# Patient Record
Sex: MALE | Race: WHITE | NOT HISPANIC OR LATINO
[De-identification: names, ages, dates, MRNs, and addresses within clinical notes are randomized per-mention and may not be internally consistent; named-entity substitution may affect disease eponyms.]

---

## 2017-01-04 ENCOUNTER — APPOINTMENT (OUTPATIENT)
Dept: HEMATOLOGY ONCOLOGY | Facility: CLINIC | Age: 72
End: 2017-01-04

## 2017-01-04 PROBLEM — Z00.00 ENCOUNTER FOR PREVENTIVE HEALTH EXAMINATION: Status: ACTIVE | Noted: 2017-01-04

## 2020-03-07 ENCOUNTER — INPATIENT (INPATIENT)
Facility: HOSPITAL | Age: 75
LOS: 3 days | Discharge: HOME | End: 2020-03-11
Attending: INTERNAL MEDICINE | Admitting: INTERNAL MEDICINE
Payer: MEDICARE

## 2020-03-07 VITALS
TEMPERATURE: 98 F | RESPIRATION RATE: 19 BRPM | DIASTOLIC BLOOD PRESSURE: 65 MMHG | WEIGHT: 205.03 LBS | SYSTOLIC BLOOD PRESSURE: 131 MMHG | OXYGEN SATURATION: 97 % | HEART RATE: 126 BPM | HEIGHT: 70 IN

## 2020-03-07 DIAGNOSIS — Z90.79 ACQUIRED ABSENCE OF OTHER GENITAL ORGAN(S): Chronic | ICD-10-CM

## 2020-03-07 DIAGNOSIS — Z96.652 PRESENCE OF LEFT ARTIFICIAL KNEE JOINT: Chronic | ICD-10-CM

## 2020-03-07 DIAGNOSIS — Z98.890 OTHER SPECIFIED POSTPROCEDURAL STATES: Chronic | ICD-10-CM

## 2020-03-07 LAB
ALBUMIN SERPL ELPH-MCNC: 4.2 G/DL — SIGNIFICANT CHANGE UP (ref 3.5–5.2)
ALP SERPL-CCNC: 66 U/L — SIGNIFICANT CHANGE UP (ref 30–115)
ALT FLD-CCNC: 21 U/L — SIGNIFICANT CHANGE UP (ref 0–41)
ANION GAP SERPL CALC-SCNC: 10 MMOL/L — SIGNIFICANT CHANGE UP (ref 7–14)
APPEARANCE UR: ABNORMAL
AST SERPL-CCNC: 19 U/L — SIGNIFICANT CHANGE UP (ref 0–41)
BACTERIA # UR AUTO: ABNORMAL
BASOPHILS # BLD AUTO: 0.01 K/UL — SIGNIFICANT CHANGE UP (ref 0–0.2)
BASOPHILS NFR BLD AUTO: 0.1 % — SIGNIFICANT CHANGE UP (ref 0–1)
BILIRUB SERPL-MCNC: 1.2 MG/DL — SIGNIFICANT CHANGE UP (ref 0.2–1.2)
BILIRUB UR-MCNC: NEGATIVE — SIGNIFICANT CHANGE UP
BUN SERPL-MCNC: 26 MG/DL — HIGH (ref 10–20)
CALCIUM SERPL-MCNC: 9.2 MG/DL — SIGNIFICANT CHANGE UP (ref 8.5–10.1)
CHLORIDE SERPL-SCNC: 99 MMOL/L — SIGNIFICANT CHANGE UP (ref 98–110)
CO2 SERPL-SCNC: 29 MMOL/L — SIGNIFICANT CHANGE UP (ref 17–32)
COLOR SPEC: YELLOW — SIGNIFICANT CHANGE UP
CREAT SERPL-MCNC: 1.2 MG/DL — SIGNIFICANT CHANGE UP (ref 0.7–1.5)
DIFF PNL FLD: ABNORMAL
EOSINOPHIL # BLD AUTO: 0 K/UL — SIGNIFICANT CHANGE UP (ref 0–0.7)
EOSINOPHIL NFR BLD AUTO: 0 % — SIGNIFICANT CHANGE UP (ref 0–8)
EPI CELLS # UR: ABNORMAL /HPF
GLUCOSE SERPL-MCNC: 108 MG/DL — HIGH (ref 70–99)
GLUCOSE UR QL: NEGATIVE MG/DL — SIGNIFICANT CHANGE UP
HCT VFR BLD CALC: 39.8 % — LOW (ref 42–52)
HGB BLD-MCNC: 13.6 G/DL — LOW (ref 14–18)
IMM GRANULOCYTES NFR BLD AUTO: 0.2 % — SIGNIFICANT CHANGE UP (ref 0.1–0.3)
KETONES UR-MCNC: 40
LEUKOCYTE ESTERASE UR-ACNC: ABNORMAL
LYMPHOCYTES # BLD AUTO: 0.83 K/UL — LOW (ref 1.2–3.4)
LYMPHOCYTES # BLD AUTO: 9.6 % — LOW (ref 20.5–51.1)
MCHC RBC-ENTMCNC: 30.7 PG — SIGNIFICANT CHANGE UP (ref 27–31)
MCHC RBC-ENTMCNC: 34.2 G/DL — SIGNIFICANT CHANGE UP (ref 32–37)
MCV RBC AUTO: 89.8 FL — SIGNIFICANT CHANGE UP (ref 80–94)
MONOCYTES # BLD AUTO: 0.82 K/UL — HIGH (ref 0.1–0.6)
MONOCYTES NFR BLD AUTO: 9.5 % — HIGH (ref 1.7–9.3)
NEUTROPHILS # BLD AUTO: 6.94 K/UL — HIGH (ref 1.4–6.5)
NEUTROPHILS NFR BLD AUTO: 80.6 % — HIGH (ref 42.2–75.2)
NITRITE UR-MCNC: POSITIVE
NRBC # BLD: 0 /100 WBCS — SIGNIFICANT CHANGE UP (ref 0–0)
PH UR: 6 — SIGNIFICANT CHANGE UP (ref 5–8)
PLATELET # BLD AUTO: 110 K/UL — LOW (ref 130–400)
POTASSIUM SERPL-MCNC: 4.2 MMOL/L — SIGNIFICANT CHANGE UP (ref 3.5–5)
POTASSIUM SERPL-SCNC: 4.2 MMOL/L — SIGNIFICANT CHANGE UP (ref 3.5–5)
PROT SERPL-MCNC: 6.6 G/DL — SIGNIFICANT CHANGE UP (ref 6–8)
PROT UR-MCNC: >=300 MG/DL
RBC # BLD: 4.43 M/UL — LOW (ref 4.7–6.1)
RBC # FLD: 12.4 % — SIGNIFICANT CHANGE UP (ref 11.5–14.5)
RBC CASTS # UR COMP ASSIST: ABNORMAL /HPF
SODIUM SERPL-SCNC: 138 MMOL/L — SIGNIFICANT CHANGE UP (ref 135–146)
SP GR SPEC: >=1.03 (ref 1.01–1.03)
UROBILINOGEN FLD QL: 0.2 MG/DL — SIGNIFICANT CHANGE UP (ref 0.2–0.2)
WBC # BLD: 8.62 K/UL — SIGNIFICANT CHANGE UP (ref 4.8–10.8)
WBC # FLD AUTO: 8.62 K/UL — SIGNIFICANT CHANGE UP (ref 4.8–10.8)
WBC UR QL: >50 /HPF

## 2020-03-07 PROCEDURE — 99285 EMERGENCY DEPT VISIT HI MDM: CPT | Mod: 25

## 2020-03-07 PROCEDURE — 74177 CT ABD & PELVIS W/CONTRAST: CPT | Mod: 26

## 2020-03-07 PROCEDURE — 51702 INSERT TEMP BLADDER CATH: CPT

## 2020-03-07 PROCEDURE — 99223 1ST HOSP IP/OBS HIGH 75: CPT

## 2020-03-07 RX ORDER — CEFTRIAXONE 500 MG/1
1000 INJECTION, POWDER, FOR SOLUTION INTRAMUSCULAR; INTRAVENOUS ONCE
Refills: 0 | Status: COMPLETED | OUTPATIENT
Start: 2020-03-07 | End: 2020-03-07

## 2020-03-07 RX ORDER — CEFTRIAXONE 500 MG/1
1000 INJECTION, POWDER, FOR SOLUTION INTRAMUSCULAR; INTRAVENOUS EVERY 24 HOURS
Refills: 0 | Status: DISCONTINUED | OUTPATIENT
Start: 2020-03-08 | End: 2020-03-08

## 2020-03-07 RX ORDER — ACETAMINOPHEN 500 MG
650 TABLET ORAL EVERY 6 HOURS
Refills: 0 | Status: DISCONTINUED | OUTPATIENT
Start: 2020-03-07 | End: 2020-03-11

## 2020-03-07 RX ORDER — CHLORHEXIDINE GLUCONATE 213 G/1000ML
1 SOLUTION TOPICAL
Refills: 0 | Status: DISCONTINUED | OUTPATIENT
Start: 2020-03-07 | End: 2020-03-11

## 2020-03-07 RX ORDER — SODIUM CHLORIDE 9 MG/ML
1000 INJECTION, SOLUTION INTRAVENOUS ONCE
Refills: 0 | Status: COMPLETED | OUTPATIENT
Start: 2020-03-07 | End: 2020-03-07

## 2020-03-07 RX ADMIN — CEFTRIAXONE 100 MILLIGRAM(S): 500 INJECTION, POWDER, FOR SOLUTION INTRAMUSCULAR; INTRAVENOUS at 19:15

## 2020-03-07 RX ADMIN — SODIUM CHLORIDE 1000 MILLILITER(S): 9 INJECTION, SOLUTION INTRAVENOUS at 16:26

## 2020-03-07 NOTE — ED PROVIDER NOTE - NSFOLLOWUPINSTRUCTIONS_ED_ALL_ED_FT
I was present for and supervised the key and critical aspects of the procedures performed during the care of the patient. ATTENDING NOTE: 74 y/o M PMH Prostatotomy, presents to the ED with urinary retention. Pt states that in the last 24 hours he has tried to urinate over 4 times and has not been able to fully release his bladder. Pt states he first noticed this when he went to the [facility], they told him his ABD looked distended and that he should come to the ED for evaluation. Pt has no other symptoms, no fevers, chills, n/v/d, cough, CP or SOB and has no symptoms with BM's. On exam: NCAT. PERRLA, EOMI. OP clear. Lungs CTAB. RRR, S1S2 noted. Radial pulses 2+ and equal, pedal pulses 2+ and equal. Abdomen soft, NT/ND, no rebound or guarding. FROM x4 extremities. No focal neuro deficits. Plan: US, Nelson cath, reevaluate.

## 2020-03-07 NOTE — ED PROVIDER NOTE - CARE PLAN
Principal Discharge DX:	Urinary retention  Secondary Diagnosis:	Pyelonephritis  Secondary Diagnosis:	UTI (urinary tract infection)

## 2020-03-07 NOTE — ED PROVIDER NOTE - NS ED ROS FT
Constitutional: (-) fever, (-) chills  Eyes: (-) visual changes  ENT: (-) nasal congestions  Cardiovascular: (-) chest pain, (-) syncope  Respiratory: (-) cough, (-) shortness of breath, (-) dyspnea,   Gastrointestinal: (-) vomiting, (-) diarrhea, (-)nausea,  Musculoskeletal: (-) neck pain, (-) back pain, (-) joint pain,  Integumentary: (-) rash, (-) edema, (-) bruises  Neurological: (-) headache, (-) loc, (-) dizziness, (-) tingling, (-)numbness,  Peripheral Vascular: (-) leg swelling  :  (-)dysuria,  (-) hematuria, (+) freq/ hesitancy   Allergic/Immunologic: (-) pruritus

## 2020-03-07 NOTE — H&P ADULT - NSHPLABSRESULTS_GEN_ALL_CORE
13.6   8.62  )-----------( 110      ( 07 Mar 2020 16:43 )             39.8       03-07    138  |  99  |  26<H>  ----------------------------<  108<H>  4.2   |  29  |  1.2    Ca    9.2      07 Mar 2020 16:43    TPro  6.6  /  Alb  4.2  /  TBili  1.2  /  DBili  x   /  AST  19  /  ALT  21  /  AlkPhos  66  03-07          Urinalysis Basic - ( 07 Mar 2020 16:43 )    Color: Yellow / Appearance: Cloudy / SG: >=1.030 / pH: x  Gluc: x / Ketone: 40  / Bili: Negative / Urobili: 0.2 mg/dL   Blood: x / Protein: >=300 mg/dL / Nitrite: Positive   Leuk Esterase: Moderate / RBC: 6-10 /HPF / WBC >50 /HPF   Sq Epi: x / Non Sq Epi: Occasional /HPF / Bacteria: Moderate      CT ABDOMEN AND PELVIS:    IMPRESSION:    Mild bilateral hydroureteronephrosis extending to the UVJ, without visualization of a radiopaque calculus, and urothelial enhancement of bilateral ureters. Findings suggestive of ascending urinary tract infection.

## 2020-03-07 NOTE — ED PROVIDER NOTE - ATTENDING CONTRIBUTION TO CARE
I was present for and supervised the key and critical aspects of the procedures performed during the care of the patient. ATTENDING NOTE: 74 y/o M PMH Prostatotomy, presents to the ED with urinary retention. Pt states that in the last 24 hours he has tried to urinate over 4 times and has not been able to fully release his bladder. Pt states he first noticed this when he went to the [facility], they told him his ABD looked distended and that he should come to the ED for evaluation. Pt has no other symptoms, no fevers, chills, n/v/d, cough, CP or SOB and has no symptoms with BM's. On exam: NCAT. PERRLA, EOMI. OP clear. Lungs CTAB. RRR, S1S2 noted. Radial pulses 2+ and equal, pedal pulses 2+ and equal. Abdomen soft, NT/ND, no rebound or guarding. FROM x4 extremities. No focal neuro deficits. Plan: US, Nelson cath, reevaluate.

## 2020-03-07 NOTE — H&P ADULT - NSICDXPASTSURGICALHX_GEN_ALL_CORE_FT
PAST SURGICAL HISTORY:  H/O hernia repair     H/O prostatectomy     History of total left knee replacement

## 2020-03-07 NOTE — ED PROVIDER NOTE - CLINICAL SUMMARY MEDICAL DECISION MAKING FREE TEXT BOX
Patient presents with symptoms of urinary retention we placed palumbo catheter, no fevers or chills, we found patient to have uti.  started on iv fluids, iv antibiotics.  I will admit patient for further management at this time

## 2020-03-07 NOTE — H&P ADULT - NSHPPHYSICALEXAM_GEN_ALL_CORE
PHYSICAL EXAM:    CONSTITUTIONAL: NAD  ENMT: EOMI, PERRLA, No tonsillar erythema, exudates, or enlargement, neck supple, No JVD  PSYCH: Alert & Oriented X3  RESPIRATORY: Clear to percussion bilaterally; No rales, rhonchi, wheezing, or rubs  CARDIOVASCULAR: Regular rate and rhythm; No murmurs, rubs, or gallops, negative edema  GASTROINTESTINAL: Soft, Nontender, Nondistended; Bowel sounds present  EXTREMITIES:  2+ Peripheral Pulses, No clubbing, cyanosis  SKIN: No rashes or lesions  Nelson in place

## 2020-03-07 NOTE — PATIENT PROFILE ADULT - ABILITY TO HEAR (WITH HEARING AID OR HEARING APPLIANCE IF NORMALLY USED):
left hearing aid- patient states very mild hearing loss left ear only./Mildly to Moderately Impaired: difficulty hearing in some environments or speaker may need to increase volume or speak distinctly

## 2020-03-07 NOTE — H&P ADULT - HISTORY OF PRESENT ILLNESS
74 yo M with PMHx of Prostatectomy presented with complaint of urinary retention for 2 days duration, patient describes incomplete voiding with dribbling of urine. Patient initially reported to urgent care, was advised to report to ED for further evaluation s/p noting abdominal distension. Patient denies dysuria, hematuria, fever, chills, nausea, vomiting, abdominal pain. In ED initial palumbo output of 250cc, s/p 1L LR bolus. 76 yo M with PMHx of Prostatectomy presented with complaint of urinary retention since yesterday evening, patient describes incomplete voiding with dribbling of urine having awoken throughout the night trying to void at least 4 times. Patient initially reported to PMD, was advised to report to ED for further evaluation s/p noting abdominal distension. Patient denies dysuria, hematuria, fever, chills, nausea, vomiting, abdominal pain. In ED initial palumbo output of 250cc, s/p 1L LR bolus.

## 2020-03-07 NOTE — ED PROVIDER NOTE - OBJECTIVE STATEMENT
76 yo male, no pmh, presents to ed for urinary sxs. pt states since yesterday, hesitancy /dribbling and bladder distention. pt states mild, has not had before, unsure of mod factors. Denies fever, chills, cp, sob, hematuria, back pain, neck pain, le swelling.

## 2020-03-07 NOTE — ED ADULT NURSE NOTE - NSIMPLEMENTINTERV_GEN_ALL_ED
Implemented All Universal Safety Interventions:  Mathias to call system. Call bell, personal items and telephone within reach. Instruct patient to call for assistance. Room bathroom lighting operational. Non-slip footwear when patient is off stretcher. Physically safe environment: no spills, clutter or unnecessary equipment. Stretcher in lowest position, wheels locked, appropriate side rails in place.

## 2020-03-07 NOTE — H&P ADULT - ASSESSMENT
76 yo M presented with complaint of urinary retention.    #Cystitis, Urinary Retention, Mild Bilateral Hydroureternephrosis, NAMAN vs. CKD III: Continue Ceftriaxone, f/u urine culture, monitor for fever, awaiting Urology evaluation     #Sinus Tachycardia on admission    Disposition: Home when medically stable. 74 yo M presented with complaint of urinary retention.    #Cystitis, Urinary Retention, Mild Bilateral Hydroureternephrosis, NAMAN vs. CKD III: Continue Ceftriaxone, f/u urine culture, monitor for fever, awaiting Urology evaluation, monitor BUN/Cr    #Sinus Tachycardia on admission    #Normocytic Anemia, Mild Thrombocytopenia: f/u repeat CBC with differential in AM    Disposition: Home when medically stable.

## 2020-03-07 NOTE — CONSULT NOTE ADULT - ASSESSMENT
Urinary Retention   cystitis on abx  continue  palumbo  catheter  will follow Urinary Retention   cystitis on abx-rocephin  continue  palumbo  catheter---         250 cc in  ed  will follow

## 2020-03-07 NOTE — ED PROCEDURE NOTE - CPROC ED POST PROC CARE GUIDE1
Instructed patient/caregiver regarding signs and symptoms of infection./Verbal/written post procedure instructions were given to patient/caregiver./Instructed patient/caregiver to follow-up with primary care physician.
Alert and oriented x 3, normal mood and affect, no apparent risk to self or others.

## 2020-03-07 NOTE — PATIENT PROFILE ADULT - BRADEN ACTIVITY
Brother was notified to bring pt to clinic.  Brother visited patient and decided to have pt go to ED.  Patient having increased weakness and SOB. O2 70s with movement. 3.5 liters chronic O2.  No neb treatments today. EMS did one neb. H/O COPD. Pt. Lives at home with no services. Meeting set up Thursday to establish if patient needs more care per brother.  
(4) walks frequently

## 2020-03-07 NOTE — ED PROVIDER NOTE - PROGRESS NOTE DETAILS
ATTENDING NOTE: 76 y/o M PMH Prostatotomy, presents to the ED with urinary retention. Pt states that in the last 24 hours he has tried to urinate over 4 times and has not been able to fully release his bladder. Pt states he first noticed this when [place] [facility] told him his ABD looked distended and that he should come to the ED for evaluation. Pt has no other symptoms, no fevers, chills, n/v/d, cough, CP or SOB and has no symptoms with BM's. On exam: NCAT. PERRLA, EOMI. OP clear. Lungs CTAB. RRR, S1S2 noted. Radial pulses 2+ and equal, pedal pulses 2+ and equal. Abdomen soft, NT/ND, no rebound or guarding. FROM x4 extremities. No focal neuro deficits. Plan: US, Nelson cath, reevaluate. dr pulliam aware of admission.

## 2020-03-07 NOTE — ED PROVIDER NOTE - PHYSICAL EXAMINATION
Physical Exam    Vital Signs: I have reviewed the initial vital signs.  Constitutional: well-nourished, appears stated age, no acute distress  Eyes: Conjunctiva pink, Sclera clear  Cardiovascular: S1 and S2, regular rate, regular rhythm, well-perfused extremities, radial pulses equal and 2+, pedal pulses 2+ and equal   Respiratory: unlabored respiratory effort, clear to auscultation bilaterally no wheezing, rales and rhonchi  Gastrointestinal: soft,mild bladder ttp, no pulsatile mass, normal bowl sounds  Musculoskeletal: supple neck, no lower extremity edema, no midline tenderness  Integumentary: warm, dry, no rash  Neurologic: awake, alert, nvi

## 2020-03-07 NOTE — CONSULT NOTE ADULT - SUBJECTIVE AND OBJECTIVE BOX
76 yo M with PMHx of Prostatectomy presented with complaint of urinary retention since yesterday evening, patient describes incomplete voiding with dribbling of urine having awoken throughout the night trying to void at least 4 times. Patient initially reported to PMD, was advised to report to ED for further evaluation s/p noting abdominal distension. Patient denies dysuria, hematuria, fever, chills, nausea, vomiting, abdominal pain. In ED initial palumbo output of 250cc, s/p 1L LR bolus.      Review of Systems:  Other Review of Systems: All other review of systems negative, except as noted in HPI	      Allergies and Intolerances:        Allergies:  	No Known Allergies:     Home Medications:   * Outpatient Medication Status not yet specified    .    Patient History:    Past Medical, Past Surgical, and Family History:  PAST MEDICAL HISTORY:  No pertinent past medical history.     PAST SURGICAL HISTORY:  H/O hernia repair     H/O prostatectomy     History of total left knee replacement.     No pertinent family history in first degree relatives.     No Pertinent Family History in first degree relatives of: unknown.     Social History:  Social History (marital status, living situation, occupation, tobacco use, alcohol and drug use, and sexual history): denies tobacco and illicit drug use, social drinker	     Tobacco Screening:  · Core Measure Site	No	    Risk Assessment:    Present on Admission:  Deep Venous Thrombosis	no	  Pulmonary Embolus	no	     Heart Failure:  Does this patient have a history of or has been diagnosed with heart failure? no.       Physical Exam:  Physical Exam: PHYSICAL EXAM:   CONSTITUTIONAL: NAD  ENMT: EOMI, PERRLA, No tonsillar erythema, exudates, or enlargement, neck supple, No JVD  PSYCH: Alert & Oriented X3  RESPIRATORY: Clear to percussion bilaterally; No rales, rhonchi, wheezing, or rubs  CARDIOVASCULAR: Regular rate and rhythm; No murmurs, rubs, or gallops, negative edema  GASTROINTESTINAL: Soft, Nontender, Nondistended; Bowel sounds present  EXTREMITIES:  2+ Peripheral Pulses, No clubbing, cyanosis  SKIN: No rashes or lesions Palumbo in place	       Labs and Results:  Labs, Radiology, Cardiology, and Other Results: 13.6   8.62  )-----------( 110      ( 07 Mar 2020 16:43 )             39.8     03-07   138  |  99  |  26<H>  ----------------------------<  108<H>  4.2   |  29  |  1.2   Ca    9.2      07 Mar 2020 16:43   TPro  6.6  /  Alb  4.2  /  TBili  1.2  /  DBili  x   /  AST  19  /  ALT  21  /  AlkPhos  66  03-07         Urinalysis Basic - ( 07 Mar 2020 16:43 )   Color: Yellow / Appearance: Cloudy / SG: >=1.030 / pH: x  Gluc: x / Ketone: 40  / Bili: Negative / Urobili: 0.2 mg/dL   Blood: x / Protein: >=300 mg/dL / Nitrite: Positive   Leuk Esterase: Moderate / RBC: 6-10 /HPF / WBC >50 /HPF   Sq Epi: x / Non Sq Epi: Occasional /HPF / Bacteria: Moderate    CT ABDOMEN AND PELVIS:   IMPRESSION:   Mild bilateral hydroureteronephrosis extending to the UVJ, without visualization of a radiopaque calculus, and urothelial enhancement of bilateral ureters. Findings suggestive of ascending urinary tract infection.	    Assessment and Plan:    Assessment:  · Assessment		  76 yo M presented with complaint of urinary retention.    #Cystitis, Urinary Retention, Mild Bilateral Hydroureternephrosis, NAMAN vs. CKD III: Continue Ceftriaxone,

## 2020-03-08 LAB
ANION GAP SERPL CALC-SCNC: 9 MMOL/L — SIGNIFICANT CHANGE UP (ref 7–14)
BASOPHILS # BLD AUTO: 0.01 K/UL — SIGNIFICANT CHANGE UP (ref 0–0.2)
BASOPHILS NFR BLD AUTO: 0.1 % — SIGNIFICANT CHANGE UP (ref 0–1)
BUN SERPL-MCNC: 24 MG/DL — HIGH (ref 10–20)
CALCIUM SERPL-MCNC: 8.7 MG/DL — SIGNIFICANT CHANGE UP (ref 8.5–10.1)
CHLORIDE SERPL-SCNC: 101 MMOL/L — SIGNIFICANT CHANGE UP (ref 98–110)
CO2 SERPL-SCNC: 29 MMOL/L — SIGNIFICANT CHANGE UP (ref 17–32)
CREAT SERPL-MCNC: 1 MG/DL — SIGNIFICANT CHANGE UP (ref 0.7–1.5)
EOSINOPHIL # BLD AUTO: 0 K/UL — SIGNIFICANT CHANGE UP (ref 0–0.7)
EOSINOPHIL NFR BLD AUTO: 0 % — SIGNIFICANT CHANGE UP (ref 0–8)
GLUCOSE SERPL-MCNC: 106 MG/DL — HIGH (ref 70–99)
HCT VFR BLD CALC: 38.4 % — LOW (ref 42–52)
HGB BLD-MCNC: 12.7 G/DL — LOW (ref 14–18)
IMM GRANULOCYTES NFR BLD AUTO: 0.3 % — SIGNIFICANT CHANGE UP (ref 0.1–0.3)
LG PLATELETS BLD QL AUTO: SLIGHT — SIGNIFICANT CHANGE UP
LYMPHOCYTES # BLD AUTO: 0.78 K/UL — LOW (ref 1.2–3.4)
LYMPHOCYTES # BLD AUTO: 11.5 % — LOW (ref 20.5–51.1)
MAGNESIUM SERPL-MCNC: 1.9 MG/DL — SIGNIFICANT CHANGE UP (ref 1.8–2.4)
MANUAL SMEAR VERIFICATION: SIGNIFICANT CHANGE UP
MCHC RBC-ENTMCNC: 30.2 PG — SIGNIFICANT CHANGE UP (ref 27–31)
MCHC RBC-ENTMCNC: 33.1 G/DL — SIGNIFICANT CHANGE UP (ref 32–37)
MCV RBC AUTO: 91.2 FL — SIGNIFICANT CHANGE UP (ref 80–94)
MONOCYTES # BLD AUTO: 0.64 K/UL — HIGH (ref 0.1–0.6)
MONOCYTES NFR BLD AUTO: 9.4 % — HIGH (ref 1.7–9.3)
NEUTROPHILS # BLD AUTO: 5.33 K/UL — SIGNIFICANT CHANGE UP (ref 1.4–6.5)
NEUTROPHILS NFR BLD AUTO: 78.7 % — HIGH (ref 42.2–75.2)
NRBC # BLD: 0 /100 WBCS — SIGNIFICANT CHANGE UP (ref 0–0)
PHOSPHATE SERPL-MCNC: 3 MG/DL — SIGNIFICANT CHANGE UP (ref 2.1–4.9)
PLAT MORPH BLD: NORMAL — SIGNIFICANT CHANGE UP
PLATELET # BLD AUTO: 92 K/UL — LOW (ref 130–400)
PLATELET CLUMP BLD QL SMEAR: SIGNIFICANT CHANGE UP
POTASSIUM SERPL-MCNC: 4.6 MMOL/L — SIGNIFICANT CHANGE UP (ref 3.5–5)
POTASSIUM SERPL-SCNC: 4.6 MMOL/L — SIGNIFICANT CHANGE UP (ref 3.5–5)
RBC # BLD: 4.21 M/UL — LOW (ref 4.7–6.1)
RBC # FLD: 12.4 % — SIGNIFICANT CHANGE UP (ref 11.5–14.5)
RBC BLD AUTO: NORMAL — SIGNIFICANT CHANGE UP
SODIUM SERPL-SCNC: 139 MMOL/L — SIGNIFICANT CHANGE UP (ref 135–146)
WBC # BLD: 6.78 K/UL — SIGNIFICANT CHANGE UP (ref 4.8–10.8)
WBC # FLD AUTO: 6.78 K/UL — SIGNIFICANT CHANGE UP (ref 4.8–10.8)

## 2020-03-08 PROCEDURE — 99233 SBSQ HOSP IP/OBS HIGH 50: CPT

## 2020-03-08 RX ORDER — IBUPROFEN 200 MG
600 TABLET ORAL EVERY 8 HOURS
Refills: 0 | Status: DISCONTINUED | OUTPATIENT
Start: 2020-03-08 | End: 2020-03-11

## 2020-03-08 RX ORDER — MEROPENEM 1 G/30ML
1000 INJECTION INTRAVENOUS EVERY 8 HOURS
Refills: 0 | Status: DISCONTINUED | OUTPATIENT
Start: 2020-03-08 | End: 2020-03-11

## 2020-03-08 RX ADMIN — Medication 600 MILLIGRAM(S): at 17:57

## 2020-03-08 RX ADMIN — MEROPENEM 100 MILLIGRAM(S): 1 INJECTION INTRAVENOUS at 18:49

## 2020-03-08 NOTE — PROGRESS NOTE ADULT - ASSESSMENT
74 yo M with PMHx of Prostatectomy presented with complaint of urinary retention since yesterday evening, patient describes incomplete voiding with dribbling of urine having awoken throughout the night trying to void at least 4 times. Patient initially reported to PMD, was advised to report to ED for further evaluation s/p noting abdominal distension. Patient denies dysuria, hematuria, fever, chills, nausea, vomiting, abdominal pain. In ED initial palumbo output of 250cc, s/p 1L LR bolus.     1) Acute Cystitis/UTI  -IV abx  -follow up cultures    2) Mild hydronephrosis/Urinary retention  -continue with Palumbo  - follow up today     3) Thrombocytopenia  -reactive?  -monitor platelets; not on AC; ambulating all over medical floors     4) Sinus tachycardia POA --- resolved    oob to chair     ambulation for dvt ppx       # Progress Note Handoff  PENDING as follows  consults:    Test: urine cultures and platelets   Family discussion: discussed with patient who comprehends his medical care and is agreeable for inpatient monitoring including labwork and Urology consult   Disposition:  Home once clinically stable     Attending Physician Dr. Bree Oh # [id number]

## 2020-03-08 NOTE — PROGRESS NOTE ADULT - SUBJECTIVE AND OBJECTIVE BOX
JOLYNN SANCHEZ 75M  urinary retention  urinary tract infection    Patient seen & examined.  No acute events noted overnight.  Palumbo in place draining yellow urine. Denies fever, chills, n/v, abdominal pain.    Admission HPI:  76 yo M with PMHx of prostate ca s/p Prostatectomy 2 y ago presented with complaint of urinary retention since yesterday evening, patient describes incomplete voiding with dribbling of urine having awoken throughout the night trying to void at least 4 times. Patient initially reported to PMD, was advised to report to ED for further evaluation s/p noting abdominal distension. Patient denies dysuria, hematuria, fever, chills, nausea, vomiting, abdominal pain. In ED initial palumbo output of 250cc, s/p 1L LR bolus. (07 Mar 2020 19:08)    PAST MEDICAL & SURGICAL HISTORY:  prostate cancer s/p prostatectomy  H/O hernia repair  History of total left knee replacement  H/O prostatectomy    I&O's Detail    07 Mar 2020 06:01  -  08 Mar 2020 07:00  --------------------------------------------------------  IN:  Total IN: 0 mL    OUT:    Indwelling Catheter - Urethral: 500 mL  Total OUT: 500 mL    Total NET: -500 mL    Vital Signs Last 24 Hrs  T(C): 36.9 (08 Mar 2020 05:00), Max: 37.8 (07 Mar 2020 19:30)  T(F): 98.5 (08 Mar 2020 05:00), Max: 100 (07 Mar 2020 19:30)  HR: 84 (08 Mar 2020 05:00) (84 - 126)  BP: 119/58 (08 Mar 2020 05:00) (119/58 - 164/72)  BP(mean): --  RR: 18 (08 Mar 2020 05:00) (18 - 19)  SpO2: 98% (07 Mar 2020 19:30) (97% - 98%)    Physical exam  General: NAD, comfortable in chair, hard of hearing  Lungs:  Clear to auscultation, No wheezes, rale or rhonchi.  CV:  S1 & S2, No murmurs, rubs or gallops. distal pulses intact  Abdomen:  + BS, soft, no distention, non-tender, No rebound, guarding or peritoneal signs.  Genitourinary:  No suprapubic tenderness, No CVAT. Palumbo in place draining cloudy yellow urine  Extremity:  No calf tenderness.      LABS:                        12.7   6.78  )-----------( 92       ( 08 Mar 2020 06:49 )             38.4     03-08    139  |  101  |  24<H>  ----------------------------<  106<H>  4.6   |  29  |  1.0    Ca    8.7      08 Mar 2020 06:49  Phos  3.0     03-08  Mg     1.9     03-08    TPro  6.6  /  Alb  4.2  /  TBili  1.2  /  DBili  x   /  AST  19  /  ALT  21  /  AlkPhos  66  03-07    Urinalysis Basic - ( 07 Mar 2020 16:43 )    Color: Yellow / Appearance: Cloudy / SG: >=1.030 / pH: x  Gluc: x / Ketone: 40  / Bili: Negative / Urobili: 0.2 mg/dL   Blood: x / Protein: >=300 mg/dL / Nitrite: Positive   Leuk Esterase: Moderate / RBC: 6-10 /HPF / WBC >50 /HPF   Sq Epi: x / Non Sq Epi: Occasional /HPF / Bacteria: Moderate      Urine Culture:           Radiology    < from: CT Abdomen and Pelvis w/ IV Cont (03.07.20 @ 18:16) >  EXAM:  CT ABDOMEN AND PELVIS IC            PROCEDURE DATE:  03/07/2020            INTERPRETATION:  CLINICAL STATEMENT: Bladder pain. Hematuria.    TECHNIQUE: Contiguous axial CT images were obtained from the lower chest to the pubic symphysis following administration of 100cc Optiray 320 intravenous contrast.  Oral contrast was not administered.  Reformatted images in the coronal and sagittal planes were acquired.    COMPARISON CT: None.    OTHER STUDIES USED FOR CORRELATION: None.       FINDINGS:    LOWER CHEST: Unremarkable.    HEPATOBILIARY: Subcentimeter hypodensities in the right liver lobe too small to characterize.    SPLEEN: Unremarkable.    PANCREAS: Fatty atrophy.    ADRENAL GLANDS: Unremarkable.    KIDNEYS: Right perinephric fat stranding. Mild hydroureteronephrosis extending to the UVJ, without visualization of a radiopaque calculus. There is urothelial enhancement of bilateral ureters to the level of UVJ.    Left lower pole renal 8 cm cyst.    ABDOMINOPELVIC NODES: Unremarkable.    PELVIC ORGANS: Collapsed bladder around a Palumbo catheter, with intraluminal air.    PERITONEUM/MESENTERY/BOWEL: Suture anastomosis in the cecum. No bowel obstruction, pneumoperitoneum or ascites. Scattered colonic diverticulosis.    BONES/SOFT TISSUES: Diffuse osteopenia and degenerative changes.      IMPRESSION:    Mild bilateral hydroureteronephrosis extending to the UVJ, without visualization of a radiopaque calculus, and urothelial enhancement of bilateral ureters. Findings suggestiveof ascending urinary tract infection.              ANGI FUENTES M.D., RESIDENT RADIOLOGIST  This document has been electronically signed.  HILLARY PINON M.D., ATTENDING RADIOLOGIST    < end of copied text >

## 2020-03-08 NOTE — PROGRESS NOTE ADULT - ASSESSMENT
JOLYNN SANCHEZ 75M  urinary retention  urinary tract infection  prostate cancer s/p prostatectomy 2 years ago    - continue rocephin  - palumbo cath in place  - I/O  - IVF  - monitor vitals  - CBC/BMP    d/w Dr. Arredondo JOLYNN SANCHEZ 75M  urinary retention  urinary tract infection  prostate cancer s/p prostatectomy 2 years ago    - continue rocephin  - awaiting urine culture results  - palumbo cath in place  - I/O  - IVF  - monitor vitals  - CBC/BMP    d/w Dr. Arredondo

## 2020-03-08 NOTE — PROGRESS NOTE ADULT - SUBJECTIVE AND OBJECTIVE BOX
JOLYNN SANCHEZ  75y  Male      Patient is a 75y old  Male who presents with a chief complaint of UTI (08 Mar 2020 09:37)      INTERVAL HPI/OVERNIGHT EVENTS:  pt seen and examined at bedside this morning; walking all over the hallways; appears comfortable  -continue with palumbo;  following   -on IV abx; will follow urine cultures   **** (pt with hx of urinary retention in the past with 9 days of palumbo continuation)    REVIEW OF SYSTEMS:  -denies dysuria    Vital Signs Last 24 Hrs  T(C): 36.9 (08 Mar 2020 05:00), Max: 37.8 (07 Mar 2020 19:30)  T(F): 98.5 (08 Mar 2020 05:00), Max: 100 (07 Mar 2020 19:30)  HR: 84 (08 Mar 2020 05:00) (84 - 126)  BP: 119/58 (08 Mar 2020 05:00) (119/58 - 164/72)  RR: 18 (08 Mar 2020 05:00) (18 - 19)  SpO2: 98% (07 Mar 2020 19:30) (97% - 98%)    PHYSICAL EXAM:  GENERAL: NAD, well-groomed, well-developed; ambulating in the hallways on the medical floor   HEAD:  Atraumatic, Normocephalic  EYES: EOMI, PERRLA, conjunctiva and sclera clear  NERVOUS SYSTEM:  Alert & Oriented X 3  CHEST/LUNG: Clear to percussion bilaterally; No rales, rhonchi, wheezing, or rubs  CV/HEART: Regular rate and rhythm; No murmurs, rubs, or gallops  GI/ABDOMEN: Soft, Nontender, Nondistended; Bowel sounds present; Palumbo bag present (dark urine)  EXTREMITIES:  2+ Peripheral Pulses, No clubbing, cyanosis, or edema  SKIN: No rashes or lesions    LAB:                        12.7   6.78  )-----------( 92       ( 08 Mar 2020 06:49 )             38.4     03-08    139  |  101  |  24<H>  ----------------------------<  106<H>  4.6   |  29  |  1.0    Ca    8.7      08 Mar 2020 06:49  Phos  3.0     03-08  Mg     1.9     03-08    TPro  6.6  /  Alb  4.2  /  TBili  1.2  /  DBili  x   /  AST  19  /  ALT  21  /  AlkPhos  66  03-07        Daily Height in cm: 177.8 (07 Mar 2020 21:45)    Daily   CAPILLARY BLOOD GLUCOSE        Urinalysis Basic - ( 07 Mar 2020 16:43 )    Color: Yellow / Appearance: Cloudy / SG: >=1.030 / pH: x  Gluc: x / Ketone: 40  / Bili: Negative / Urobili: 0.2 mg/dL   Blood: x / Protein: >=300 mg/dL / Nitrite: Positive   Leuk Esterase: Moderate / RBC: 6-10 /HPF / WBC >50 /HPF   Sq Epi: x / Non Sq Epi: Occasional /HPF / Bacteria: Moderate      LIVER FUNCTIONS - ( 07 Mar 2020 16:43 )  Alb: 4.2 g/dL / Pro: 6.6 g/dL / ALK PHOS: 66 U/L / ALT: 21 U/L / AST: 19 U/L / GGT: x           RADIOLOGY:    Imaging Personally Reviewed:  [ Y ] YES  [ ] NO    CT Abdomen and Pelvis w/ IV Cont (03.07.20 @ 18:16)     IMPRESSION:    Mild bilateral hydroureteronephrosis extending to the UVJ, without visualization of a radiopaque calculus, and urothelial enhancement of bilateral ureters. Findings suggestiveof ascending urinary tract infection.        HEALTH ISSUES - PROBLEM Dx:    MEDS:  acetaminophen   Tablet .. 650 milliGRAM(s) Oral every 6 hours PRN  cefTRIAXone   IVPB 1000 milliGRAM(s) IV Intermittent every 24 hours  chlorhexidine 4% Liquid 1 Application(s) Topical <User Schedule>

## 2020-03-09 LAB
ANION GAP SERPL CALC-SCNC: 10 MMOL/L — SIGNIFICANT CHANGE UP (ref 7–14)
BUN SERPL-MCNC: 26 MG/DL — HIGH (ref 10–20)
CALCIUM SERPL-MCNC: 8.6 MG/DL — SIGNIFICANT CHANGE UP (ref 8.5–10.1)
CHLORIDE SERPL-SCNC: 102 MMOL/L — SIGNIFICANT CHANGE UP (ref 98–110)
CO2 SERPL-SCNC: 27 MMOL/L — SIGNIFICANT CHANGE UP (ref 17–32)
CREAT SERPL-MCNC: 1 MG/DL — SIGNIFICANT CHANGE UP (ref 0.7–1.5)
GLUCOSE SERPL-MCNC: 96 MG/DL — SIGNIFICANT CHANGE UP (ref 70–99)
HCT VFR BLD CALC: 36.9 % — LOW (ref 42–52)
HGB BLD-MCNC: 12.3 G/DL — LOW (ref 14–18)
MCHC RBC-ENTMCNC: 30.2 PG — SIGNIFICANT CHANGE UP (ref 27–31)
MCHC RBC-ENTMCNC: 33.3 G/DL — SIGNIFICANT CHANGE UP (ref 32–37)
MCV RBC AUTO: 90.7 FL — SIGNIFICANT CHANGE UP (ref 80–94)
NRBC # BLD: 0 /100 WBCS — SIGNIFICANT CHANGE UP (ref 0–0)
PLATELET # BLD AUTO: 80 K/UL — LOW (ref 130–400)
POTASSIUM SERPL-MCNC: 4.6 MMOL/L — SIGNIFICANT CHANGE UP (ref 3.5–5)
POTASSIUM SERPL-SCNC: 4.6 MMOL/L — SIGNIFICANT CHANGE UP (ref 3.5–5)
RBC # BLD: 4.07 M/UL — LOW (ref 4.7–6.1)
RBC # FLD: 12.3 % — SIGNIFICANT CHANGE UP (ref 11.5–14.5)
SODIUM SERPL-SCNC: 139 MMOL/L — SIGNIFICANT CHANGE UP (ref 135–146)
WBC # BLD: 4.44 K/UL — LOW (ref 4.8–10.8)
WBC # FLD AUTO: 4.44 K/UL — LOW (ref 4.8–10.8)

## 2020-03-09 PROCEDURE — 99233 SBSQ HOSP IP/OBS HIGH 50: CPT

## 2020-03-09 PROCEDURE — 76770 US EXAM ABDO BACK WALL COMP: CPT | Mod: 26

## 2020-03-09 RX ADMIN — MEROPENEM 100 MILLIGRAM(S): 1 INJECTION INTRAVENOUS at 21:05

## 2020-03-09 RX ADMIN — MEROPENEM 100 MILLIGRAM(S): 1 INJECTION INTRAVENOUS at 13:09

## 2020-03-09 NOTE — CHART NOTE - NSCHARTNOTEFT_GEN_A_CORE
Will repeat renal/bladder sono to see if B/L hydronephrosis has resolved since having palumbo in place

## 2020-03-09 NOTE — PROGRESS NOTE ADULT - SUBJECTIVE AND OBJECTIVE BOX
JOLYNN SANCHEZ  75y  Male      Patient is a 75y old  Male who presents with a chief complaint of UTI (08 Mar 2020 09:37)      INTERVAL HPI/OVERNIGHT EVENTS:  pt seen and examined at bedside this morning; walking all over the hallways; appears comfortable  -continue with palumbo;  following   -switched to broad spectrum IV abx as pt with temp > 101  -ID consult   **** (pt with hx of urinary retention in the past with 9 days of palumbo continuation)    REVIEW OF SYSTEMS:  -denies dysuria      Vital Signs Last 24 Hrs  T(C): 36.6 (09 Mar 2020 05:24), Max: 38.3 (08 Mar 2020 17:20)  T(F): 97.8 (09 Mar 2020 05:24), Max: 101 (08 Mar 2020 17:20)  HR: 72 (09 Mar 2020 05:24) (72 - 84)  BP: 121/57 (09 Mar 2020 05:24) (116/62 - 122/64)  RR: 16 (09 Mar 2020 05:24) (16 - 16)    PHYSICAL EXAM:  GENERAL: NAD, well-groomed, well-developed; ambulating in the hallways on the medical floor   HEAD:  Atraumatic, Normocephalic  EYES: EOMI, PERRLA, conjunctiva and sclera clear  NERVOUS SYSTEM:  Alert & Oriented X 3  CHEST/LUNG: Clear to percussion bilaterally; No rales, rhonchi, wheezing, or rubs  CV/HEART: Regular rate and rhythm; No murmurs, rubs, or gallops  GI/ABDOMEN: Soft, Nontender, Nondistended; Bowel sounds present; Palumbo bag present (dark urine)  EXTREMITIES:  2+ Peripheral Pulses, No clubbing, cyanosis, or edema  SKIN: No rashes or lesions    LAB:    am labs pending              12.7   6.78  )-----------( 92       ( 08 Mar 2020 06:49 )             38.4     03-08    139  |  101  |  24<H>  ----------------------------<  106<H>  4.6   |  29  |  1.0    Ca    8.7      08 Mar 2020 06:49  Phos  3.0     03-08  Mg     1.9     03-08    TPro  6.6  /  Alb  4.2  /  TBili  1.2  /  DBili  x   /  AST  19  /  ALT  21  /  AlkPhos  66  03-07        Daily Height in cm: 177.8 (07 Mar 2020 21:45)    Daily   CAPILLARY BLOOD GLUCOSE        Urinalysis Basic - ( 07 Mar 2020 16:43 )    Color: Yellow / Appearance: Cloudy / SG: >=1.030 / pH: x  Gluc: x / Ketone: 40  / Bili: Negative / Urobili: 0.2 mg/dL   Blood: x / Protein: >=300 mg/dL / Nitrite: Positive   Leuk Esterase: Moderate / RBC: 6-10 /HPF / WBC >50 /HPF   Sq Epi: x / Non Sq Epi: Occasional /HPF / Bacteria: Moderate      LIVER FUNCTIONS - ( 07 Mar 2020 16:43 )  Alb: 4.2 g/dL / Pro: 6.6 g/dL / ALK PHOS: 66 U/L / ALT: 21 U/L / AST: 19 U/L / GGT: x           RADIOLOGY:    Imaging Personally Reviewed:  [ Y ] YES  [ ] NO    CT Abdomen and Pelvis w/ IV Cont (03.07.20 @ 18:16)     IMPRESSION:    Mild bilateral hydroureteronephrosis extending to the UVJ, without visualization of a radiopaque calculus, and urothelial enhancement of bilateral ureters. Findings suggestiveof ascending urinary tract infection.        HEALTH ISSUES - PROBLEM Dx:    MEDICATIONS  (STANDING):  chlorhexidine 4% Liquid 1 Application(s) Topical <User Schedule>  meropenem  IVPB 1000 milliGRAM(s) IV Intermittent every 8 hours    MEDICATIONS  (PRN):  acetaminophen   Tablet .. 650 milliGRAM(s) Oral every 6 hours PRN Temp greater or equal to 38C (100.4F), Mild Pain (1 - 3)  ibuprofen  Tablet. 600 milliGRAM(s) Oral every 8 hours PRN Temp greater or equal to 38C (100.4F), Mild Pain (1 - 3)

## 2020-03-09 NOTE — PROGRESS NOTE ADULT - ASSESSMENT
74 yo M with PMHx of Prostatectomy presented with complaint of urinary retention since yesterday evening, patient describes incomplete voiding with dribbling of urine having awoken throughout the night trying to void at least 4 times. Patient initially reported to PMD, was advised to report to ED for further evaluation s/p noting abdominal distension. Patient denies dysuria, hematuria, fever, chills, nausea, vomiting, abdominal pain. In ED initial palumbo output of 250cc, s/p 1L LR bolus.     1) Acute Cystitis/UTI/SIRS  -IV meropenem for now until seen by ID   -follow up cultures    2) Mild hydronephrosis/Urinary retention  -continue with Palumbo  - follow up today     3) Thrombocytopenia  -reactive?  -monitor platelets; not on AC; ambulating all over medical floors     4) Sinus tachycardia POA --- resolved    oob to chair     ambulation for dvt ppx       # Progress Note Handoff  PENDING as follows  consults:  follow up   Test: urine cultures and platelets   Family discussion: discussed with patient who comprehends his medical care and is agreeable for inpatient monitoring including labwork and palumbo continuation   Disposition:  Home once clinically stable; ACUTE     Attending Physician Dr. Bree hO # [id number]

## 2020-03-10 PROCEDURE — 99233 SBSQ HOSP IP/OBS HIGH 50: CPT

## 2020-03-10 RX ADMIN — MEROPENEM 100 MILLIGRAM(S): 1 INJECTION INTRAVENOUS at 22:14

## 2020-03-10 RX ADMIN — MEROPENEM 100 MILLIGRAM(S): 1 INJECTION INTRAVENOUS at 15:25

## 2020-03-10 RX ADMIN — MEROPENEM 100 MILLIGRAM(S): 1 INJECTION INTRAVENOUS at 05:15

## 2020-03-10 NOTE — PROGRESS NOTE ADULT - ASSESSMENT
JOLYNN SANCHEZ 75M  urinary retention  urinary tract infection  prostate cancer s/p prostatectomy 2 years ago    - continue meropenem  - f/u final urine culture results  - cont palumbo for now  - ?D/C home with palumbo (will D/W attending today)

## 2020-03-10 NOTE — PROGRESS NOTE ADULT - ASSESSMENT
76 yo M with PMHx of Prostatectomy presented with complaint of urinary retention since yesterday evening, patient describes incomplete voiding with dribbling of urine having awoken throughout the night trying to void at least 4 times. Patient initially reported to PMD, was advised to report to ED for further evaluation s/p noting abdominal distension. Patient denies dysuria, hematuria, fever, chills, nausea, vomiting, abdominal pain. In ED initial palumbo output of 250cc, s/p 1L LR bolus.     1) Acute Cystitis/UTI/SIRS  -IV meropenem for now until seen by ID   -follow up cultures (GNR); pending sensitivities     2) Mild hydronephrosis/Urinary retention  -continue with Palumbo  - follow up noted  -outpatient follow up with     3) Thrombocytopenia  -reactive?  -monitor platelets; not on AC; ambulating all over medical floors     oob to chair     ambulation for dvt ppx       # Progress Note Handoff  PENDING as follows  consults:  follow up   Test: urine cultures and platelets   Family discussion: discussed with patient who comprehends his medical care and is working on his transfer to [facility]; at present there is no ACCEPTING PHYSICIAN and pt is on appropriate regimen in our hospital   Disposition:  Home once clinically stable; ACUTE     Attending Physician Dr. Bree Oh # [id number]

## 2020-03-10 NOTE — CHART NOTE - NSCHARTNOTEFT_GEN_A_CORE
Case D/W Dr. Arredondo: recommend to D/C patient home with palumbo catheter.  He is known to Dr. Leroy BRADY ([facility]). Follow up appointment made - THursday 3/12  at 12:30 pm ([address]) fo evaluation and possible TOV. Pt made aware

## 2020-03-10 NOTE — PROGRESS NOTE ADULT - SUBJECTIVE AND OBJECTIVE BOX
JOLYNN SANCHEZ  75y  Male      Patient is a 75y old  Male who presents with a chief complaint of UTI (08 Mar 2020 09:37)      INTERVAL HPI/OVERNIGHT EVENTS:  pt seen and examined at bedside this morning; walking all over the hallways; appears comfortable  -continue with palumbo;  following   -on IV antibiotics; ID consult   -pt is stable on current abx regimen and will follow up with his Urologist Dr. Harper at [facility].  -pt is working on his transfer to [facility] and when all done with insurance; ok with his transfer     REVIEW OF SYSTEMS:  -denies dysuria    Vital Signs Last 24 Hrs  T(C): 36.6 (10 Mar 2020 05:00), Max: 37.2 (09 Mar 2020 14:10)  T(F): 97.8 (10 Mar 2020 05:00), Max: 98.9 (09 Mar 2020 14:10)  HR: 74 (10 Mar 2020 05:00) (74 - 99)  BP: 119/57 (10 Mar 2020 05:00) (119/57 - 176/74)  RR: 16 (10 Mar 2020 05:00) (16 - 16)    PHYSICAL EXAM:  GENERAL: NAD, well-groomed, well-developed; ambulating in the hallways on the medical floor   HEAD:  Atraumatic, Normocephalic  EYES: EOMI, PERRLA, conjunctiva and sclera clear  NERVOUS SYSTEM:  Alert & Oriented X 3  CHEST/LUNG: Clear to percussion bilaterally; No rales, rhonchi, wheezing, or rubs  CV/HEART: Regular rate and rhythm; No murmurs, rubs, or gallops  GI/ABDOMEN: Soft, Nontender, Nondistended; Bowel sounds present; Palumbo bag present (dark urine)  EXTREMITIES:  2+ Peripheral Pulses, No clubbing, cyanosis, or edema  SKIN: No rashes or lesions    LAB:                          12.3   4.44  )-----------( 80       ( 09 Mar 2020 06:31 )             36.9   03-09    139  |  102  |  26<H>  ----------------------------<  96  4.6   |  27  |  1.0    Ca    8.6      09 Mar 2020 06:31    Daily Height in cm: 177.8 (07 Mar 2020 21:45)    Daily   CAPILLARY BLOOD GLUCOSE        Urinalysis Basic - ( 07 Mar 2020 16:43 )    Color: Yellow / Appearance: Cloudy / SG: >=1.030 / pH: x  Gluc: x / Ketone: 40  / Bili: Negative / Urobili: 0.2 mg/dL   Blood: x / Protein: >=300 mg/dL / Nitrite: Positive   Leuk Esterase: Moderate / RBC: 6-10 /HPF / WBC >50 /HPF   Sq Epi: x / Non Sq Epi: Occasional /HPF / Bacteria: Moderate      LIVER FUNCTIONS - ( 07 Mar 2020 16:43 )  Alb: 4.2 g/dL / Pro: 6.6 g/dL / ALK PHOS: 66 U/L / ALT: 21 U/L / AST: 19 U/L / GGT: x           RADIOLOGY:    Imaging Personally Reviewed:  [ Y ] YES  [ ] NO    CT Abdomen and Pelvis w/ IV Cont (03.07.20 @ 18:16)     IMPRESSION:    Mild bilateral hydroureteronephrosis extending to the UVJ, without visualization of a radiopaque calculus, and urothelial enhancement of bilateral ureters. Findings suggestiveof ascending urinary tract infection.        HEALTH ISSUES - PROBLEM Dx:    MEDICATIONS  (STANDING):  chlorhexidine 4% Liquid 1 Application(s) Topical <User Schedule>  meropenem  IVPB 1000 milliGRAM(s) IV Intermittent every 8 hours    MEDICATIONS  (PRN):  acetaminophen   Tablet .. 650 milliGRAM(s) Oral every 6 hours PRN Temp greater or equal to 38C (100.4F), Mild Pain (1 - 3)  ibuprofen  Tablet. 600 milliGRAM(s) Oral every 8 hours PRN Temp greater or equal to 38C (100.4F), Mild Pain (1 - 3)

## 2020-03-10 NOTE — PROGRESS NOTE ADULT - SUBJECTIVE AND OBJECTIVE BOX
S; No new complaints; palumbo in place, clear urine, having some overflow likely due to spasms  O; Vital Signs Last 24 Hrs  T(C): 36.6 (10 Mar 2020 05:00), Max: 37.2 (09 Mar 2020 14:10)  T(F): 97.8 (10 Mar 2020 05:00), Max: 98.9 (09 Mar 2020 14:10)  HR: 74 (10 Mar 2020 05:00) (74 - 99)  BP: 119/57 (10 Mar 2020 05:00) (119/57 - 176/74)  BP(mean): --  RR: 16 (10 Mar 2020 05:00) (16 - 16)  SpO2: --    I&O's Detail    09 Mar 2020 07:01  -  10 Mar 2020 07:00  --------------------------------------------------------  IN:  Total IN: 0 mL    OUT:    Indwelling Catheter - Urethral: 1200 mL  Total OUT: 1200 mL    Total NET: -1200 mL    MEDICATIONS  (STANDING):  chlorhexidine 4% Liquid 1 Application(s) Topical <User Schedule>  meropenem  IVPB 1000 milliGRAM(s) IV Intermittent every 8 hours      EXAM:  gen: nontoxic appearing  abd: soft NT/ND, + palumbo, urine clear    Labs:  CAPILLARY BLOOD GLUCOSE                              12.3   4.44  )-----------( 80       ( 09 Mar 2020 06:31 )             36.9         03-09    139  |  102  |  26<H>  ----------------------------<  96  4.6   |  27  |  1.0        Culture - Urine (collected 07 Mar 2020 19:00)  Source: .Urine Catheterized  Preliminary Report (09 Mar 2020 16:23):    >100,000 CFU/ml Gram Negative Rods

## 2020-03-11 ENCOUNTER — TRANSCRIPTION ENCOUNTER (OUTPATIENT)
Age: 75
End: 2020-03-11

## 2020-03-11 VITALS
TEMPERATURE: 97 F | DIASTOLIC BLOOD PRESSURE: 58 MMHG | RESPIRATION RATE: 16 BRPM | HEART RATE: 75 BPM | SYSTOLIC BLOOD PRESSURE: 114 MMHG

## 2020-03-11 PROCEDURE — 99238 HOSP IP/OBS DSCHRG MGMT 30/<: CPT

## 2020-03-11 RX ORDER — CIPROFLOXACIN LACTATE 400MG/40ML
1 VIAL (ML) INTRAVENOUS
Qty: 14 | Refills: 0
Start: 2020-03-11 | End: 2020-03-17

## 2020-03-11 RX ADMIN — MEROPENEM 100 MILLIGRAM(S): 1 INJECTION INTRAVENOUS at 05:55

## 2020-03-11 RX ADMIN — CHLORHEXIDINE GLUCONATE 1 APPLICATION(S): 213 SOLUTION TOPICAL at 05:55

## 2020-03-11 NOTE — DISCHARGE NOTE PROVIDER - HOSPITAL COURSE
76 yo M with PMHx of Prostatectomy presented with complaint of urinary retention since yesterday evening, patient describes incomplete voiding with dribbling of urine having awoken throughout the night trying to void at least 4 times. Patient initially reported to PMD, was advised to report to ED for further evaluation s/p noting abdominal distension. Patient was treated for acute E.coli UTI. Had palumbo placed while inpatient and evaluated by urology. Patient medically stable for discharge with palumbo and antibiotics. He is to follow up with his urologist outpatient.

## 2020-03-11 NOTE — DISCHARGE NOTE PROVIDER - NSDCFUADDAPPT_GEN_ALL_CORE_FT
Urology, Dr. Harper ([facility]). Follow up appointment made - Thursday 3/12  at 12:30 pm ([address]) for evaluation and possible TOV.

## 2020-03-11 NOTE — PROGRESS NOTE ADULT - SUBJECTIVE AND OBJECTIVE BOX
JOLYNN SANCHEZ  75y, Male  Allergy: No Known Allergies    Hospital Day: 4d    Patient seen and examined earlier today. No acute events overnight.    PMH/PSH:  PAST MEDICAL & SURGICAL HISTORY:  No pertinent past medical history  H/O hernia repair  History of total left knee replacement  H/O prostatectomy    VITALS:  T(F): 96.8 (03-11-20 @ 05:48), Max: 98.1 (03-10-20 @ 22:01)  HR: 75 (03-11-20 @ 05:48)  BP: 114/58 (03-11-20 @ 05:48) (114/58 - 137/73)  RR: 16 (03-11-20 @ 05:48)  SpO2: --    TESTS & MEASUREMENTS:  Weight (Kg):   BMI (kg/m2): 29.9 (03-07)    03-09-20 @ 07:01  -  03-10-20 @ 07:00  --------------------------------------------------------  IN: 0 mL / OUT: 1200 mL / NET: -1200 mL    03-10-20 @ 07:01  -  03-11-20 @ 07:00  --------------------------------------------------------  IN: 0 mL / OUT: 2100 mL / NET: -2100 mL    Culture - Urine (collected 03-07-20 @ 19:00)  Source: .Urine Catheterized  Final Report (03-10-20 @ 13:45):    >100,000 CFU/ml Escherichia coli  Organism: Escherichia coli (03-10-20 @ 13:45)  Organism: Escherichia coli (03-10-20 @ 13:45)      -  Amikacin: S <=16      -  Ampicillin: S <=8 These ampicillin results predict results for amoxicillin      -  Ampicillin/Sulbactam: S <=8/4 Enterobacter, Citrobacter, and Serratia may develop resistance during prolonged therapy (3-4 days)      -  Aztreonam: S <=4      -  Cefazolin: S <=8 (MIC_CL_COM_ENTERIC_CEFAZU) For uncomplicated UTI with K. pneumoniae, E. coli, or P. mirablis: ANNIE <=16 is sensitive and ANNIE >=32 is resistant. This also predicts results for oral agents cefaclor, cefdinir, cefpodoxime, cefprozil, cefuroxime axetil, cephalexin and locarbef for uncomplicated UTI. Note that some isolates may be susceptible to these agents while testing resistant to cefazolin.      -  Cefepime: S <=4      -  Cefoxitin: S <=8      -  Ceftriaxone: S <=1 Enterobacter, Citrobacter, and Serratia may develop resistance during prolonged therapy      -  Ciprofloxacin: S <=1      -  Gentamicin: S <=4      -  Imipenem: S <=1      -  Levofloxacin: S <=2      -  Meropenem: S <=1      -  Nitrofurantoin: S <=32 Should not be used to treat pyelonephritis      -  Piperacillin/Tazobactam: S <=16      -  Tigecycline: S <=2      -  Tobramycin: S <=4      -  Trimethoprim/Sulfamethoxazole: S <=2/38      Method Type: ANNIE    MEDICATIONS:  MEDICATIONS  (STANDING):  chlorhexidine 4% Liquid 1 Application(s) Topical <User Schedule>  meropenem  IVPB 1000 milliGRAM(s) IV Intermittent every 8 hours    MEDICATIONS  (PRN):  acetaminophen   Tablet .. 650 milliGRAM(s) Oral every 6 hours PRN Temp greater or equal to 38C (100.4F), Mild Pain (1 - 3)  ibuprofen  Tablet. 600 milliGRAM(s) Oral every 8 hours PRN Temp greater or equal to 38C (100.4F), Mild Pain (1 - 3)    HOME MEDICATIONS:    REVIEW OF SYSTEMS:  All other review of systems is negative unless indicated above.     PHYSICAL EXAM:  GENERAL: NAD  HEENT: No Swelling  CHEST/LUNG: Good air entry, No wheezing  HEART: RRR, No murmurs  ABDOMEN: Soft, Bowel sounds present  EXTREMITIES:  No clubbing

## 2020-03-11 NOTE — DISCHARGE NOTE NURSING/CASE MANAGEMENT/SOCIAL WORK - NSDCFUADDAPPT_GEN_ALL_CORE_FT
Urology, Dr. Harper ([facility]). Follow up appointment made - Thursday 3/12  at 12:30 pm ([address]) for evaluation and possible TOV.

## 2020-03-11 NOTE — PROGRESS NOTE ADULT - ASSESSMENT
74 yo M with PMHx of Prostatectomy presented with complaint of urinary retention since yesterday evening, patient describes incomplete voiding with dribbling of urine having awoken throughout the night trying to void at least 4 times. Patient initially reported to PMD, was advised to report to ED for further evaluation s/p noting abdominal distension. Patient denies dysuria, hematuria, fever, chills, nausea, vomiting, abdominal pain. In ED initial palumbo output of 250cc, s/p 1L LR bolus.     1) Acute Cystitis/UTI/SIRS  -IV meropenem for now until seen by ID   -UCX + E.coli, c/s noted. Will discharge patient on seven more days of ciprofloxacin 500 BID    2) Mild hydronephrosis/Urinary retention  -continue with Palumbo  - follow up noted  -outpatient follow up with patient's urologist, Dr. Harper. Appt scheduled for Thursday, 03/12.    3) Thrombocytopenia  -reactive?  -monitor platelets; not on AC; ambulating all over medical floors     oob to chair     ambulation for dvt ppx     DC planning

## 2020-03-11 NOTE — DISCHARGE NOTE PROVIDER - NSDCCPCAREPLAN_GEN_ALL_CORE_FT
PRINCIPAL DISCHARGE DIAGNOSIS  Diagnosis: Urinary retention  Assessment and Plan of Treatment: Follow up with your urologist as scheduled      SECONDARY DISCHARGE DIAGNOSES  Diagnosis: UTI (urinary tract infection)  Assessment and Plan of Treatment: Continue ciprofloxacin 500mg twice daily for 7 days. Follow up with your urologist

## 2020-03-11 NOTE — DISCHARGE NOTE NURSING/CASE MANAGEMENT/SOCIAL WORK - PATIENT PORTAL LINK FT
You can access the FollowMyHealth Patient Portal offered by [facility] by registering at the following website: [email or web address]. By joining [id number]’s FollowMyHealth portal, you will also be able to view your health information using other applications (apps) compatible with our system.

## 2020-03-16 DIAGNOSIS — R33.9 RETENTION OF URINE, UNSPECIFIED: ICD-10-CM

## 2020-03-16 DIAGNOSIS — N18.3 CHRONIC KIDNEY DISEASE, STAGE 3 (MODERATE): ICD-10-CM

## 2020-03-16 DIAGNOSIS — N17.9 ACUTE KIDNEY FAILURE, UNSPECIFIED: ICD-10-CM

## 2020-03-16 DIAGNOSIS — R65.10 SYSTEMIC INFLAMMATORY RESPONSE SYNDROME (SIRS) OF NON-INFECTIOUS ORIGIN WITHOUT ACUTE ORGAN DYSFUNCTION: ICD-10-CM

## 2020-03-16 DIAGNOSIS — N30.00 ACUTE CYSTITIS WITHOUT HEMATURIA: ICD-10-CM

## 2020-03-16 DIAGNOSIS — Z96.652 PRESENCE OF LEFT ARTIFICIAL KNEE JOINT: ICD-10-CM

## 2020-03-16 DIAGNOSIS — Z85.46 PERSONAL HISTORY OF MALIGNANT NEOPLASM OF PROSTATE: ICD-10-CM

## 2020-03-16 DIAGNOSIS — Z90.79 ACQUIRED ABSENCE OF OTHER GENITAL ORGAN(S): ICD-10-CM

## 2020-03-16 DIAGNOSIS — R00.0 TACHYCARDIA, UNSPECIFIED: ICD-10-CM

## 2020-03-16 DIAGNOSIS — D64.9 ANEMIA, UNSPECIFIED: ICD-10-CM

## 2020-03-16 DIAGNOSIS — N13.6 PYONEPHROSIS: ICD-10-CM

## 2020-03-16 DIAGNOSIS — D69.6 THROMBOCYTOPENIA, UNSPECIFIED: ICD-10-CM

## 2023-03-27 NOTE — ED ADULT TRIAGE NOTE - NS ED NURSE BANDS TYPE
Name band; Valtrex Pregnancy And Lactation Text: this medication is Pregnancy Category B and is considered safe during pregnancy. This medication is not directly found in breast milk but it's metabolite acyclovir is present.